# Patient Record
Sex: FEMALE | Race: WHITE | NOT HISPANIC OR LATINO | URBAN - NONMETROPOLITAN AREA
[De-identification: names, ages, dates, MRNs, and addresses within clinical notes are randomized per-mention and may not be internally consistent; named-entity substitution may affect disease eponyms.]

---

## 2019-05-30 ENCOUNTER — IMPORTED ENCOUNTER (OUTPATIENT)
Dept: URBAN - NONMETROPOLITAN AREA CLINIC 1 | Facility: CLINIC | Age: 64
End: 2019-05-30

## 2019-05-30 PROBLEM — H25.23: Noted: 2019-05-30

## 2019-05-30 PROBLEM — H35.371: Noted: 2019-05-30

## 2019-05-30 PROBLEM — H25.013: Noted: 2019-05-30

## 2019-05-30 PROCEDURE — 99213 OFFICE O/P EST LOW 20 MIN: CPT

## 2019-05-30 NOTE — PATIENT DISCUSSION
Cataract(s)-Visually significant.-Cataract(s) causing symptomatic impairment of visual function not correctable with a tolerable change in glasses or contact lenses lighting or non-operative means resulting in specific activity limitations and/or participation restrictions including but not limited to reading viewing television driving or meeting vocational or recreational needs. -Expectation is clearer vision and reduced glare disability after cataract removal.-Refer to Dr Demetrius Garcia for cataract evaluation pt wants to wait until AugEpiretinal membrane-Discussed findings of exam in detail with the patient.-Discussed the signs of worsening of the disease.

## 2019-09-13 ENCOUNTER — IMPORTED ENCOUNTER (OUTPATIENT)
Dept: URBAN - NONMETROPOLITAN AREA CLINIC 1 | Facility: CLINIC | Age: 64
End: 2019-09-13

## 2019-09-13 PROBLEM — H25.813: Noted: 2019-09-13

## 2019-09-13 PROCEDURE — 92014 COMPRE OPH EXAM EST PT 1/>: CPT

## 2019-09-13 NOTE — PATIENT DISCUSSION
Cataract(s)-Visually significant cataract OU.-Cataract(s) causing symptomatic impairment of visual function not correctable with a tolerable change in glasses or contact lenses lighting or non-operative means resulting in specific activity limitations and/or participation restrictions including but not limited to reading viewing television driving or meeting vocational or recreational needs. -Expectation is clearer vision and functional improvement in symptoms as well as reduced glare disability after cataract removal.-Order IOLMaster and OPD today. -Recommend Toric IOL OD/Limbal Relaxing Incisions OS based on today's OPD testing and lifestyle questionnaire.-All questions were answered regarding surgery including pre and post-op medications appointments activity restrictions and anesthetic usage.-The risks benefits and alternatives and special risk factors for the patient were discussed in detail including but not limited to: bleeding infection retinal detachment vitreous loss problems with the implant and possible need for additional surgery.-Although rare the possibility of complete vision loss was discussed.-The possible need for glasses post-operatively was discussed.-Order medical clearance exam based on history of melanoma.-Patient elects to proceed with cataract surgery OD. Extra Provisc OUDES OD>OSDiscussed diagnosis with patient. Recommend frequent tears OU. Scratches noted on anterior segment exam today. Start ATs and Lotemax TID OU x 7 days repeat topos at preop.

## 2019-10-14 ENCOUNTER — IMPORTED ENCOUNTER (OUTPATIENT)
Dept: URBAN - NONMETROPOLITAN AREA CLINIC 1 | Facility: CLINIC | Age: 64
End: 2019-10-14

## 2019-10-14 PROBLEM — H25.813: Noted: 2019-10-14

## 2019-10-16 ENCOUNTER — IMPORTED ENCOUNTER (OUTPATIENT)
Dept: URBAN - NONMETROPOLITAN AREA CLINIC 1 | Facility: CLINIC | Age: 64
End: 2019-10-16

## 2019-10-18 PROBLEM — H25.813: Noted: 2019-10-18

## 2019-10-18 PROBLEM — Z01.818: Noted: 2019-10-18

## 2019-10-25 ENCOUNTER — IMPORTED ENCOUNTER (OUTPATIENT)
Dept: URBAN - NONMETROPOLITAN AREA CLINIC 1 | Facility: CLINIC | Age: 64
End: 2019-10-25

## 2019-10-25 PROBLEM — H25.813: Noted: 2019-10-18

## 2019-10-25 PROBLEM — Z98.41: Noted: 2019-10-25

## 2019-10-25 NOTE — PATIENT DISCUSSION
s/p PCIOL-Pt doing well s/p PCIOL. -Continue post-op gtts according to instruction sheet and sleep with eye shield over eye for 7 nights.-Avoid bending at the waist lifting anything over 5lbs and dirty or gerber environments. -RTC .

## 2019-10-29 ENCOUNTER — IMPORTED ENCOUNTER (OUTPATIENT)
Dept: URBAN - NONMETROPOLITAN AREA CLINIC 1 | Facility: CLINIC | Age: 64
End: 2019-10-29

## 2019-10-29 PROBLEM — Z01.818: Noted: 2019-10-29

## 2019-10-29 PROBLEM — H25.812: Noted: 2019-10-29

## 2019-10-29 PROCEDURE — 99213 OFFICE O/P EST LOW 20 MIN: CPT

## 2019-10-29 PROCEDURE — 99024 POSTOP FOLLOW-UP VISIT: CPT

## 2019-10-29 NOTE — PATIENT DISCUSSION
Cataract(s)-Visually significant cataract OS . -Cataract(s) causing symptomatic impairment of visual function not correctable with a tolerable change in glasses or contact lenses lighting or non-operative means resulting in specific activity limitations and/or participation restrictions including but not limited to reading viewing television driving or meeting vocational or recreational needs. -Expectation is clearer vision and functional improvement in symptoms as well as reduced glare disability after cataract removal.-Recommend Toric/Trad based on previous OPD testing and lifestyle questionnaire.-All questions were answered regarding surgery including pre and post-op medications appointments activity restrictions and anesthetic usage.-The risks benefits and alternatives and special risk factors for the patient were discussed in detail including but not limited to: bleeding infection retinal detachment vitreous loss problems with the implant and possible need for additional surgery.-Although rare the possibility of complete vision loss was discussed.-The need for glasses post-operatively was discussed.-Patient elects to proceed with cataract surgery OS . Will schedule at patient's convenience. s/p PCIOL OD 10/24/19-Pt doing well at 1 week s/p PCIOL. -Continue post-op gtts according to instruction sheet.-Okay to resume usual activites and d/c eye shield.

## 2019-11-01 ENCOUNTER — IMPORTED ENCOUNTER (OUTPATIENT)
Dept: URBAN - NONMETROPOLITAN AREA CLINIC 1 | Facility: CLINIC | Age: 64
End: 2019-11-01

## 2019-11-01 PROBLEM — Z98.42: Noted: 2019-11-01

## 2019-11-01 NOTE — PATIENT DISCUSSION
s/p PCIOL-Pt doing well s/p PCIOL. -Continue post-op gtts according to instruction sheet and sleep with eye shield over eye for 7 nights.-Avoid bending at the waist lifting anything over 5lbs and dirty or gerber environments. -RTC 1 week [address] Rajiv Garcia .

## 2019-11-06 ENCOUNTER — IMPORTED ENCOUNTER (OUTPATIENT)
Dept: URBAN - NONMETROPOLITAN AREA CLINIC 1 | Facility: CLINIC | Age: 64
End: 2019-11-06

## 2019-11-06 PROBLEM — Z98.41: Noted: 2019-11-06

## 2019-11-06 PROBLEM — Z98.42: Noted: 2019-11-01

## 2019-11-06 PROCEDURE — 99024 POSTOP FOLLOW-UP VISIT: CPT

## 2019-11-06 NOTE — PATIENT DISCUSSION
s/p PCIOL-Pt doing well at 1 week s/p PCIOL. -Continue post-op gtts according to instruction sheet.-Okay to resume usual activites and d/c eye shield. RTC 4 Mo F/U PC IOL OU

## 2020-03-03 ENCOUNTER — IMPORTED ENCOUNTER (OUTPATIENT)
Dept: URBAN - NONMETROPOLITAN AREA CLINIC 1 | Facility: CLINIC | Age: 65
End: 2020-03-03

## 2020-03-03 PROBLEM — Z96.1: Noted: 2020-03-03

## 2020-03-03 PROBLEM — H26.493: Noted: 2020-03-03

## 2020-03-03 PROBLEM — H26.491: Noted: 2020-10-08

## 2020-03-03 PROCEDURE — 99212 OFFICE O/P EST SF 10 MIN: CPT

## 2020-03-03 NOTE — PATIENT DISCUSSION
s/p PCIOL-Stable PCIOL OU w/PCO-Explained symptoms of advancing PCO. -Continue to monitor for now. Pt will notify us if any new symptoms develop.

## 2020-10-01 ENCOUNTER — IMPORTED ENCOUNTER (OUTPATIENT)
Dept: URBAN - NONMETROPOLITAN AREA CLINIC 1 | Facility: CLINIC | Age: 65
End: 2020-10-01

## 2020-10-01 PROCEDURE — 92014 COMPRE OPH EXAM EST PT 1/>: CPT

## 2020-10-01 PROCEDURE — 66821 AFTER CATARACT LASER SURGERY: CPT

## 2020-10-01 NOTE — PATIENT DISCUSSION
PCO-Explained PCO and RBAs of YAG Capsulotomy to pt. -Pt elects to proceed. YAG Caps OS today and YAG Caps OD in 1-2 weeks.

## 2020-10-08 ENCOUNTER — IMPORTED ENCOUNTER (OUTPATIENT)
Dept: URBAN - NONMETROPOLITAN AREA CLINIC 1 | Facility: CLINIC | Age: 65
End: 2020-10-08

## 2020-10-08 PROCEDURE — 66821 AFTER CATARACT LASER SURGERY: CPT

## 2022-04-15 ASSESSMENT — VISUAL ACUITY
OD_PAM: 20/20
OS_SC: 20/25
OD_SC: 20/20
OD_CC: 20/20
OS_CC: 20/40
OS_PH: 20/30
OS_CC: 20/20
OS_CC: 20/20
OS_GLARE: 20/40
OD_CC: 20/20
OD_GLARE: 20/70
OD_PH: 20/20
OS_AM: 20/20
OS_SC: 20/20
OS_AM: 20/20
OS_CC: 20/25+
OS_CC: J2
OD_SC: 20/25+
OS_CC: 20/25+4
OS_GLARE: 20/40
OS_CC: 20/20
OD_CC: J2
OS_PH: 20/20
OD_CC: 20/25+
OD_CC: 20/20
OS_CC: 20/200
OD_CC: 20/30-

## 2022-04-15 ASSESSMENT — TONOMETRY
OD_IOP_MMHG: 12
OD_IOP_MMHG: 13
OS_IOP_MMHG: 14
OS_IOP_MMHG: 14
OS_IOP_MMHG: 12
OD_IOP_MMHG: 12
OD_IOP_MMHG: 15
OS_IOP_MMHG: 13
OS_IOP_MMHG: 16
OD_IOP_MMHG: 16
OD_IOP_MMHG: 13
OS_IOP_MMHG: 13
OS_IOP_MMHG: 14
OD_IOP_MMHG: 18
OD_IOP_MMHG: 13
OS_IOP_MMHG: 15

## 2023-11-21 ENCOUNTER — NEW PATIENT (OUTPATIENT)
Dept: RURAL CLINIC 2 | Facility: CLINIC | Age: 68
End: 2023-11-21

## 2023-11-21 DIAGNOSIS — H43.813: ICD-10-CM

## 2023-11-21 DIAGNOSIS — H35.361: ICD-10-CM

## 2023-11-21 DIAGNOSIS — H52.4: ICD-10-CM

## 2023-11-21 DIAGNOSIS — Z96.1: ICD-10-CM

## 2023-11-21 PROCEDURE — 92004 COMPRE OPH EXAM NEW PT 1/>: CPT

## 2023-11-21 PROCEDURE — 92134 CPTRZ OPH DX IMG PST SGM RTA: CPT

## 2023-11-21 ASSESSMENT — TONOMETRY
OD_IOP_MMHG: 12
OS_IOP_MMHG: 12

## 2023-11-21 ASSESSMENT — VISUAL ACUITY
OU_SC: 20/20
OD_SC: 20/25+2
OS_SC: 20/30

## 2024-11-21 ENCOUNTER — COMPREHENSIVE EXAM (OUTPATIENT)
Dept: RURAL CLINIC 2 | Facility: CLINIC | Age: 69
End: 2024-11-21

## 2024-11-21 DIAGNOSIS — H16.223: ICD-10-CM

## 2024-11-21 DIAGNOSIS — Z96.1: ICD-10-CM

## 2024-11-21 DIAGNOSIS — H43.813: ICD-10-CM

## 2024-11-21 DIAGNOSIS — H35.361: ICD-10-CM

## 2024-11-21 PROCEDURE — 92014 COMPRE OPH EXAM EST PT 1/>: CPT
